# Patient Record
Sex: FEMALE | Race: WHITE | Employment: FULL TIME | URBAN - METROPOLITAN AREA
[De-identification: names, ages, dates, MRNs, and addresses within clinical notes are randomized per-mention and may not be internally consistent; named-entity substitution may affect disease eponyms.]

---

## 2018-02-21 PROBLEM — J45.909 UNCOMPLICATED ASTHMA: Status: ACTIVE | Noted: 2018-02-21

## 2018-02-21 PROBLEM — R23.2 HOT FLASHES: Status: ACTIVE | Noted: 2018-02-21

## 2018-02-21 PROBLEM — J30.9 ALLERGIC RHINITIS: Status: ACTIVE | Noted: 2018-02-21

## 2018-02-21 PROBLEM — K21.9 GASTROESOPHAGEAL REFLUX DISEASE: Status: ACTIVE | Noted: 2018-02-21

## 2018-02-21 PROBLEM — Z86.010 HISTORY OF COLON POLYPS: Status: ACTIVE | Noted: 2018-02-21

## 2018-02-21 PROBLEM — K59.00 CONSTIPATION: Status: ACTIVE | Noted: 2018-02-21

## 2018-02-21 PROBLEM — Z85.3 HISTORY OF BREAST CANCER IN FEMALE: Status: ACTIVE | Noted: 2018-02-21

## 2018-03-21 ENCOUNTER — HOSPITAL ENCOUNTER (OUTPATIENT)
Dept: MAMMOGRAPHY | Age: 58
Discharge: HOME | End: 2018-03-21
Attending: INTERNAL MEDICINE
Payer: COMMERCIAL

## 2018-03-21 DIAGNOSIS — Z85.3 HISTORY OF BREAST CANCER IN FEMALE: ICD-10-CM

## 2018-03-21 DIAGNOSIS — Z12.31 ENCOUNTER FOR SCREENING MAMMOGRAM FOR MALIGNANT NEOPLASM OF BREAST: ICD-10-CM

## 2018-03-21 PROCEDURE — 77063 BREAST TOMOSYNTHESIS BI: CPT

## 2018-05-24 ENCOUNTER — HOSPITAL ENCOUNTER (OUTPATIENT)
Dept: LAB | Age: 58
Discharge: HOME | End: 2018-05-24

## 2018-05-24 PROCEDURE — 88305 TISSUE EXAM BY PATHOLOGIST: CPT | Performed by: INTERNAL MEDICINE

## 2018-10-18 PROBLEM — B00.9 HSV-1 (HERPES SIMPLEX VIRUS 1) INFECTION: Status: ACTIVE | Noted: 2018-10-18

## 2019-04-18 ENCOUNTER — HOSPITAL ENCOUNTER (OUTPATIENT)
Dept: MAMMOGRAPHY | Age: 59
Discharge: HOME | End: 2019-04-18
Attending: INTERNAL MEDICINE
Payer: COMMERCIAL

## 2019-04-18 DIAGNOSIS — Z12.31 VISIT FOR SCREENING MAMMOGRAM: ICD-10-CM

## 2019-04-18 PROCEDURE — 77063 BREAST TOMOSYNTHESIS BI: CPT

## 2019-07-18 PROBLEM — M19.042 OSTEOARTHRITIS OF BOTH HANDS: Status: ACTIVE | Noted: 2019-07-18

## 2019-07-18 PROBLEM — M77.32 HEEL SPUR, LEFT: Status: ACTIVE | Noted: 2019-07-18

## 2019-07-18 PROBLEM — M19.041 OSTEOARTHRITIS OF BOTH HANDS: Status: ACTIVE | Noted: 2019-07-18

## 2019-07-18 PROBLEM — L65.9 ALOPECIA: Status: ACTIVE | Noted: 2019-07-18

## 2021-03-30 ENCOUNTER — TRANSCRIBE ORDER (OUTPATIENT)
Dept: SCHEDULING | Age: 61
End: 2021-03-30

## 2021-03-30 DIAGNOSIS — Z12.31 VISIT FOR SCREENING MAMMOGRAM: Primary | ICD-10-CM

## 2021-05-20 ENCOUNTER — HOSPITAL ENCOUNTER (OUTPATIENT)
Dept: MAMMOGRAPHY | Age: 61
Discharge: HOME | End: 2021-05-20
Attending: INTERNAL MEDICINE
Payer: COMMERCIAL

## 2021-05-20 DIAGNOSIS — Z12.31 VISIT FOR SCREENING MAMMOGRAM: ICD-10-CM

## 2021-05-20 PROCEDURE — 77067 SCR MAMMO BI INCL CAD: CPT

## 2021-05-25 ENCOUNTER — HOSPITAL ENCOUNTER (OUTPATIENT)
Dept: MAMMOGRAPHY | Age: 61
Discharge: HOME | End: 2021-05-25
Attending: OBSTETRICS & GYNECOLOGY
Payer: COMMERCIAL

## 2021-05-25 DIAGNOSIS — R92.8 ABNORMAL SCREENING MAMMOGRAM: ICD-10-CM

## 2021-05-25 PROCEDURE — 77061 BREAST TOMOSYNTHESIS UNI: CPT

## 2021-05-25 PROCEDURE — 76642 ULTRASOUND BREAST LIMITED: CPT

## 2022-03-18 PROBLEM — J30.9 ALLERGIC RHINITIS: Status: ACTIVE | Noted: 2018-02-21

## 2022-03-19 PROBLEM — M19.041 OSTEOARTHRITIS OF BOTH HANDS: Status: ACTIVE | Noted: 2019-07-18

## 2022-03-19 PROBLEM — L65.9 ALOPECIA: Status: ACTIVE | Noted: 2019-07-18

## 2022-03-19 PROBLEM — J45.909 UNCOMPLICATED ASTHMA: Status: ACTIVE | Noted: 2018-02-21

## 2022-03-19 PROBLEM — Z85.3 HISTORY OF BREAST CANCER IN FEMALE: Status: ACTIVE | Noted: 2018-02-21

## 2022-03-19 PROBLEM — K21.9 GASTROESOPHAGEAL REFLUX DISEASE: Status: ACTIVE | Noted: 2018-02-21

## 2022-03-19 PROBLEM — Z86.010 HISTORY OF COLON POLYPS: Status: ACTIVE | Noted: 2018-02-21

## 2022-03-19 PROBLEM — B00.9 HSV-1 (HERPES SIMPLEX VIRUS 1) INFECTION: Status: ACTIVE | Noted: 2018-10-18

## 2022-03-19 PROBLEM — M19.042 OSTEOARTHRITIS OF BOTH HANDS: Status: ACTIVE | Noted: 2019-07-18

## 2022-03-19 PROBLEM — Z86.0100 HISTORY OF COLON POLYPS: Status: ACTIVE | Noted: 2018-02-21

## 2022-03-20 PROBLEM — K59.00 CONSTIPATION: Status: ACTIVE | Noted: 2018-02-21

## 2022-03-20 PROBLEM — R23.2 HOT FLASHES: Status: ACTIVE | Noted: 2018-02-21

## 2022-03-20 PROBLEM — M77.32 HEEL SPUR, LEFT: Status: ACTIVE | Noted: 2019-07-18

## 2022-05-25 RX ORDER — ESTRADIOL 10 UG/1
INSERT VAGINAL
Qty: 8 TABLET | Refills: 12 | OUTPATIENT
Start: 2022-05-25

## 2022-06-08 ENCOUNTER — HOSPITAL ENCOUNTER (OUTPATIENT)
Dept: MAMMOGRAPHY | Age: 62
Discharge: HOME | End: 2022-06-11
Payer: COMMERCIAL

## 2022-06-08 DIAGNOSIS — Z12.31 VISIT FOR SCREENING MAMMOGRAM: ICD-10-CM

## 2022-06-08 PROCEDURE — 77063 BREAST TOMOSYNTHESIS BI: CPT

## 2022-06-13 DIAGNOSIS — Z85.3 PERSONAL HISTORY OF BREAST CANCER: Primary | ICD-10-CM

## 2022-06-13 DIAGNOSIS — R92.2 DENSE BREAST TISSUE: ICD-10-CM

## 2022-06-16 ENCOUNTER — OFFICE VISIT (OUTPATIENT)
Dept: OBGYN CLINIC | Age: 62
End: 2022-06-16
Payer: COMMERCIAL

## 2022-06-16 VITALS
DIASTOLIC BLOOD PRESSURE: 72 MMHG | SYSTOLIC BLOOD PRESSURE: 138 MMHG | WEIGHT: 215.6 LBS | BODY MASS INDEX: 34.65 KG/M2 | HEIGHT: 66 IN

## 2022-06-16 DIAGNOSIS — Z01.419 WELL WOMAN EXAM: Primary | ICD-10-CM

## 2022-06-16 DIAGNOSIS — Z13.89 SCREENING FOR GENITOURINARY CONDITION: ICD-10-CM

## 2022-06-16 PROCEDURE — 99396 PREV VISIT EST AGE 40-64: CPT | Performed by: OBSTETRICS & GYNECOLOGY

## 2022-06-16 RX ORDER — PHENTERMINE HYDROCHLORIDE 37.5 MG/1
37.5 CAPSULE ORAL EVERY MORNING
COMMUNITY

## 2022-06-16 RX ORDER — ESCITALOPRAM OXALATE 20 MG/1
20 TABLET ORAL DAILY
Qty: 30 TABLET | Refills: 11 | Status: SHIPPED | OUTPATIENT
Start: 2022-06-16

## 2022-06-16 RX ORDER — ESTRADIOL 10 UG/1
10 INSERT VAGINAL
Qty: 24 TABLET | Refills: 3 | Status: SHIPPED | OUTPATIENT
Start: 2022-06-16

## 2022-06-16 RX ORDER — OMEPRAZOLE 20 MG/1
20 CAPSULE, DELAYED RELEASE ORAL DAILY
COMMUNITY

## 2022-06-16 NOTE — PROGRESS NOTES
HPI: Ms. Riki Farrar is a 57 y.o.   OB History        0    Para   0    Term   0       0    AB   0    Living   0       SAB   0    IAB   0    Ectopic   0    Molar   0    Multiple   0    Live Births   0             who is here today for a well woman exam. She complains of hot flashes. She also complains of low libido and weight gain. Currently using vagifem and feels like her symptoms are become worse. Seeing David Santos at [facility]- had hormones checked of course menopausal levels  E2  Undetectable   Retired  Recommended mri   Date Performed Result   PAP 3/29/2007 Hysterectomy   2007   Mammogram 2022 STABLE POST-TREATMENT APPEARANCE OF THE LEFT BREAST WITH NO SPECIFIC   MAMMOGRAPHIC EVIDENCE FOR MALIGNANCY ON EITHER SIDE.  FOLLOW-UP BILATERAL   SCREENING MAMMOGRAPHY IS RECOMMENDED IN ONE YEAR.       2.  HIGH -RISK SCREENING BREAST MRI SHOULD BE CONSIDERED AT THIS TIME FOR THIS   57YEAR-OLD WITH HETEROGENEOUSLY DENSE BREAST PARENCHYMA AND A PERSONAL HISTORY   OF BREAST CANCER. MRI order placed    Colonoscopy 2018  Repeat in  \"CECAL POLYP\":          TUBULAR ADENOMA.          B:  \"TRANSVERSE COLON POLYP\":          TUBULAR ADENOMA. Dexa 2011 WNL         No obstetric history on file. GYN History           No LMP recorded. Patient has had a hysterectomy.      Past Medical History:  Past Medical History:   Diagnosis Date    Allergic rhinitis 2018    Alopecia 2019    Small spot - following with dermatology    Anxiety     Breast cancer ([facility] [address]     Broken ankle     Constipation 2018    Depression     Gastroesophageal reflux disease 2018    H/O back injury 6118,2199 & 2002    bulging dics    Heel spur, left 2019    History of breast cancer in female 2018 left breast lumpectomy s/p 3 years of Tamoxifen stopped due to intolerance    History of colon polyps 2018    Hot flashes 2018    HSV-1 (herpes simplex virus 1) infection 10/18/2018    Menopause     Osteoarthritis of both hands 7/18/2019    Plantar fasciitis of left foot     Postmenopausal     Sinus infection     Uncomplicated asthma 8/71/1626    Vaginal atrophy        Past Surgical History:  Past Surgical History:   Procedure Laterality Date    BREAST BIOPSY Right     BREAST LUMPECTOMY Left     Core Bx    CATARACT REMOVAL  2017    COLONOSCOPY  2008    see scanned documents - polyp noted    ENDOMETRIAL CRYOABLATION      LUMBAR LAMINECTOMY  2007    ORTHOPEDIC SURGERY Left 2004    Fx ankle    OTHER SURGICAL HISTORY      Yag procedure on eyes    ARCADIO AND BSO (CERVIX REMOVED)  2007       Allergies:   No Known Allergies    Medication History:  Current Outpatient Medications   Medication Sig Dispense Refill    omeprazole (PRILOSEC) 20 MG delayed release capsule Take 20 mg by mouth daily      phentermine (ADIPEX-P) 37.5 MG capsule Take 37.5 mg by mouth every morning.       Lactobacillus (CULTURELLE DIGESTIVE WOMENS PO) Take by mouth      COLLAGEN PO Take by mouth      albuterol sulfate  (90 Base) MCG/ACT inhaler Inhale into the lungs      Azelastine-Fluticasone 137-50 MCG/ACT SUSP by Nasal route      clobetasol (TEMOVATE) 0.05 % cream Apply pea sized about to affected area 4x daily x 7 days, then 3x daily x 7 days, then 2x daily x 7 days, and then once weekly x 7 days      diclofenac sodium (VOLTAREN) 1 % GEL Use small amount to hands twice daily as needed      escitalopram (LEXAPRO) 10 MG tablet TAKE ONE TABLET BY MOUTH EVERY DAY      Estradiol (VAGIFEM) 10 MCG TABS vaginal tablet Place 10 mcg vaginally      omeprazole (PRILOSEC OTC) 20 MG tablet Take 20 mg by mouth daily      valACYclovir (VALTREX) 1 g tablet Take 2 tabs twice daily for 24 hours with onset of sympotms      docusate (COLACE, DULCOLAX) 100 MG CAPS Take 100 mg by mouth 2 times daily      famotidine (PEPCID) 20 MG tablet Take 20 mg by mouth 2 times daily       No current facility-administered medications for this visit. Social History:  Social History     Socioeconomic History    Marital status:      Spouse name: Not on file    Number of children: Not on file    Years of education: Not on file    Highest education level: Not on file   Occupational History    Not on file   Tobacco Use    Smoking status: Never Smoker    Smokeless tobacco: Never Used   Substance and Sexual Activity    Alcohol use: Yes    Drug use: No    Sexual activity: Not on file   Other Topics Concern    Not on file   Social History Narrative    Lives with    No children     Social Determinants of Health     Financial Resource Strain:     Difficulty of Paying Living Expenses: Not on file   Food Insecurity:     Worried About Running Out of Food in the Last Year: Not on file    Jenny of Food in the Last Year: Not on file   Transportation Needs:     Lack of Transportation (Medical): Not on file    Lack of Transportation (Non-Medical):  Not on file   Physical Activity:     Days of Exercise per Week: Not on file    Minutes of Exercise per Session: Not on file   Stress:     Feeling of Stress : Not on file   Social Connections:     Frequency of Communication with Friends and Family: Not on file    Frequency of Social Gatherings with Friends and Family: Not on file    Attends Shinto Services: Not on file    Active Member of [address] [place] [address] [employer] or Organizations: Not on file    Attends Club or Organization Meetings: Not on file    Marital Status: Not on file   Intimate Partner Violence:     Fear of Current or Ex-Partner: Not on file    Emotionally Abused: Not on file    Physically Abused: Not on file    Sexually Abused: Not on file   Housing Stability:     Unable to Pay for Housing in the Last Year: Not on file    Number of [place] in the Last Year: Not on file    Unstable Housing in the Last Year: Not on file       Family History:  Family History   Problem Relation Age of Onset  Stroke Mother     Diabetes Mother     Heart Disease Mother     Alzheimer's Disease Mother     Breast Cancer Neg Hx     Cancer Mother         lung    Diabetes Father     Hypertension Father     Alzheimer's Disease Father     Dementia Mother        Review of Systems - General ROS: negative except for that discussed in HPI      ROS:  Feeling well. No dyspnea or chest pain on exertion. No abdominal pain, change in bowel habits, black or bloody stools. No urinary tract symptoms. No neurological complaints. Objective:   /72   Ht 5' 6\" (1.676 m)   Wt 215 lb 9.6 oz (97.8 kg)   BMI 34.80 kg/m²     No results found for any visits on 06/16/22. The patient appears well, alert, oriented x 3, in no distress. ENT normal.  Neck supple. No adenopathy or thyromegaly. Lungs:  clear, good air entry, no wheezes, rhonchi or rales. Heart:  S1 and S2 normal, no murmurs, regular rate and rhythm. Abdomen:  soft without tenderness, guarding, mass or organomegaly. Extremities show no edema, normal peripheral pulses. Neurological is normal, no focal findings. BREAST EXAM: breasts appear normal, no suspicious masses, no skin or nipple changes or axillary nodes    PELVIC EXAM: External genitalia is within normal limits, urethra, urethra meatus and bladder are midline well supported. Vagina is well rugated, Cervix absent no ovarian masses palpated    Assessment/Plan:      Diagnosis Orders   1. Well woman exam  AMB POC URINALYSIS DIP STICK AUTO W/O MICRO   2. Screening for genitourinary condition  AMB POC URINALYSIS DIP STICK AUTO W/O MICRO     Encounter Diagnoses   Name Primary?     Well woman exam Yes    Screening for genitourinary condition      Orders Placed This Encounter   Procedures    AMB POC URINALYSIS DIP STICK AUTO W/O MICRO       mammogram  return annually or prn  Increase lexapro for severe pms  MRI  Refill valtrex and  vagifem    Dada Blackmon RN

## 2022-06-28 ENCOUNTER — HOSPITAL ENCOUNTER (OUTPATIENT)
Dept: MRI IMAGING | Age: 62
Discharge: HOME | End: 2022-07-01
Payer: COMMERCIAL

## 2022-06-28 DIAGNOSIS — Z85.3 PERSONAL HISTORY OF BREAST CANCER: ICD-10-CM

## 2022-06-28 DIAGNOSIS — R92.2 DENSE BREAST TISSUE: ICD-10-CM

## 2022-06-28 PROCEDURE — C8908 MRI W/O FOL W/CONT, BREAST,: HCPCS

## 2022-06-28 PROCEDURE — 2580000003 HC RX 258: Performed by: OBSTETRICS & GYNECOLOGY

## 2022-06-28 PROCEDURE — A9579 GAD-BASE MR CONTRAST NOS,1ML: HCPCS | Performed by: OBSTETRICS & GYNECOLOGY

## 2022-06-28 PROCEDURE — 6360000004 HC RX CONTRAST MEDICATION: Performed by: OBSTETRICS & GYNECOLOGY

## 2022-06-28 RX ORDER — SODIUM CHLORIDE 0.9 % (FLUSH) 0.9 %
30 SYRINGE (ML) INJECTION AS NEEDED
Status: DISCONTINUED | OUTPATIENT
Start: 2022-06-28 | End: 2022-07-02 | Stop reason: HOSPADM

## 2022-06-28 RX ADMIN — GADOTERIDOL 20 ML: 279.3 INJECTION, SOLUTION INTRAVENOUS at 17:28

## 2022-06-28 RX ADMIN — SODIUM CHLORIDE, PRESERVATIVE FREE 30 ML: 5 INJECTION INTRAVENOUS at 17:27

## 2022-07-05 ENCOUNTER — HOSPITAL ENCOUNTER (OUTPATIENT)
Dept: MAMMOGRAPHY | Age: 62
Discharge: HOME | End: 2022-07-08
Payer: COMMERCIAL

## 2022-07-05 ENCOUNTER — APPOINTMENT (OUTPATIENT)
Dept: MAMMOGRAPHY | Age: 62
End: 2022-07-05
Payer: COMMERCIAL

## 2022-07-05 DIAGNOSIS — Z85.3 HX OF BREAST CANCER: ICD-10-CM

## 2022-07-05 DIAGNOSIS — R92.8 ABNORMAL MRI, BREAST: ICD-10-CM

## 2022-07-05 PROCEDURE — 76642 ULTRASOUND BREAST LIMITED: CPT

## 2022-07-05 PROCEDURE — 77065 DX MAMMO INCL CAD UNI: CPT

## 2022-07-26 ENCOUNTER — TRANSCRIBE ORDER (OUTPATIENT)
Dept: SCHEDULING | Age: 62
End: 2022-07-26

## 2022-07-26 DIAGNOSIS — M47.26 OSTEOARTHRITIS OF SPINE WITH RADICULOPATHY, LUMBAR REGION: Primary | ICD-10-CM

## 2023-07-17 ENCOUNTER — TRANSCRIBE ORDERS (OUTPATIENT)
Dept: SCHEDULING | Age: 63
End: 2023-07-17

## 2023-07-17 ENCOUNTER — TELEPHONE (OUTPATIENT)
Dept: OBGYN CLINIC | Age: 63
End: 2023-07-17

## 2023-07-17 DIAGNOSIS — Z12.31 BREAST CANCER SCREENING BY MAMMOGRAM: Primary | ICD-10-CM

## 2023-07-17 RX ORDER — ESTRADIOL 10 UG/1
10 INSERT VAGINAL
Qty: 16 TABLET | Refills: 0 | Status: SHIPPED | OUTPATIENT
Start: 2023-07-17

## 2023-07-17 NOTE — TELEPHONE ENCOUNTER
Pt LVM requesting refills on estradiol. Pt last seen 6/16/2022 for [employer] and has next [address] scheduled for 8/21/23. Meds filled to next appointment per standing orders.

## 2023-07-19 ENCOUNTER — HOSPITAL ENCOUNTER (OUTPATIENT)
Dept: MAMMOGRAPHY | Age: 63
Discharge: HOME | End: 2023-07-22
Attending: OBSTETRICS & GYNECOLOGY
Payer: MEDICARE

## 2023-07-19 DIAGNOSIS — Z12.31 BREAST CANCER SCREENING BY MAMMOGRAM: ICD-10-CM

## 2023-07-19 PROCEDURE — 77063 BREAST TOMOSYNTHESIS BI: CPT

## 2023-07-20 ENCOUNTER — TELEPHONE (OUTPATIENT)
Dept: OBGYN CLINIC | Age: 63
End: 2023-07-20

## 2023-07-20 NOTE — TELEPHONE ENCOUNTER
Spoke with patient regarding breast MRI recommended by radiology. Pt stated she had an MRI done last year and her out of pocket cost was very high. Pt has a personal history of breast cancer. Pt reports she has now switched to Medicare. Pt instructed to contact medicare and discuss her medical history and recommendation by radiologist for breast MRI to determine coverage. Pt has WWE 8/21/23 with Dr Noe De Leon and stated she will discuss the MRI with her at that time as well. No further questions. Pt to call if she would like MRI order placed prior to her WWE.

## 2023-07-31 ENCOUNTER — TELEPHONE (OUTPATIENT)
Dept: OBGYN CLINIC | Age: 63
End: 2023-07-31

## 2023-07-31 RX ORDER — VALACYCLOVIR HYDROCHLORIDE 1 G/1
TABLET, FILM COATED ORAL
Qty: 10 TABLET | Refills: 0 | Status: SHIPPED | OUTPATIENT
Start: 2023-07-31

## 2023-07-31 NOTE — TELEPHONE ENCOUNTER
Pt LVM requesting refills on Valtrex. Pt has [employer] scheduled for 8/21/23. Pt last seen 6/16/22.  Medication refilled per standing orders to next appointment

## 2023-08-14 ENCOUNTER — TELEPHONE (OUTPATIENT)
Dept: OBGYN CLINIC | Age: 63
End: 2023-08-14

## 2023-08-14 RX ORDER — ESCITALOPRAM OXALATE 20 MG/1
20 TABLET ORAL DAILY
Qty: 30 TABLET | Refills: 0 | Status: SHIPPED | OUTPATIENT
Start: 2023-08-14

## 2023-08-14 NOTE — TELEPHONE ENCOUNTER
Pt LVM requesting refill on Lexapro. Pt scheduled for [facility] 8/21/23. Meds refilled per standing orders to next appointment.

## 2023-08-17 NOTE — PROGRESS NOTES
HPI: Ms. Andres Escobedo is a 70 y.o.   OB History          0    Para   0    Term   0       0    AB   0    Living   0         SAB   0    IAB   0    Ectopic   0    Molar   0    Multiple   0    Live Births   0             who is here today for a well woman exam. She complains of nothing  Needs refills on Lexapro and Vafigem 90 day supply. Retired volunteering  Feels like she can go back down to 10 mg Lexapro         Date Performed Result   PAP 3/9/07   Hysterectomy    Mammogram 23 BD3, Neg  2. Patient is due for follow-up MRI (as reported on 2022 and 2022  imaging) to ensure stability of right enhancement. Colonoscopy 18  Needs to repeat in 5 years due now   A:  \"CECAL POLYP\":          TUBULAR ADENOMA. B:  \"TRANSVERSE COLON POLYP\":          TUBULAR ADENOMA   Dexa 2011 wnl                 GYN History           No LMP recorded. Patient has had a hysterectomy.        Past Medical History:  Past Medical History:   Diagnosis Date    Allergic rhinitis 2018    Allergic rhinitis 2018    Alopecia 2019    Small spot - following with dermatology    Alopecia 2019    Small spot - following with dermatology    Anxiety     Anxiety     Breast cancer ([address])     Breast cancer ([address])     Broken ankle 2005    Broken ankle     Constipation 2018    Constipation 2018    Depression     Depression     Gastroesophageal reflux disease 2018    Gastroesophageal reflux disease 2018    H/O back injury 3744,1902 &     bulging dics    H/O back injury 7312,1432 &     bulging dics    Heel spur, left 2019    Heel spur, left 2019    History of breast cancer in female 2018 left breast lumpectomy s/p 3 years of Tamoxifen stopped due to intolerance    History of breast cancer in female 2018 left breast lumpectomy s/p 3 years of Tamoxifen stopped due to intolerance    History of colon polyps 2018    History of colon polyps

## 2023-08-21 ENCOUNTER — OFFICE VISIT (OUTPATIENT)
Dept: OBGYN CLINIC | Age: 63
End: 2023-08-21
Payer: MEDICARE

## 2023-08-21 VITALS
WEIGHT: 222.6 LBS | BODY MASS INDEX: 35.77 KG/M2 | DIASTOLIC BLOOD PRESSURE: 84 MMHG | HEIGHT: 66 IN | SYSTOLIC BLOOD PRESSURE: 134 MMHG

## 2023-08-21 DIAGNOSIS — Z98.890 HISTORY OF LUMPECTOMY OF LEFT BREAST: ICD-10-CM

## 2023-08-21 DIAGNOSIS — Z85.3 HISTORY OF BREAST CANCER IN FEMALE: ICD-10-CM

## 2023-08-21 DIAGNOSIS — Z92.89 HISTORY OF MRI: ICD-10-CM

## 2023-08-21 DIAGNOSIS — Z01.419 WELL WOMAN EXAM: Primary | ICD-10-CM

## 2023-08-21 DIAGNOSIS — Z12.31 SCREENING MAMMOGRAM FOR BREAST CANCER: ICD-10-CM

## 2023-08-21 DIAGNOSIS — R92.2 DENSE BREAST TISSUE ON MAMMOGRAM: ICD-10-CM

## 2023-08-21 PROCEDURE — G0101 CA SCREEN;PELVIC/BREAST EXAM: HCPCS | Performed by: OBSTETRICS & GYNECOLOGY

## 2023-08-21 RX ORDER — LACTOBACILLUS RHAMNOSUS GG 10B CELL
1 CAPSULE ORAL DAILY
COMMUNITY

## 2023-08-21 RX ORDER — ESTRADIOL 10 UG/1
10 INSERT VAGINAL
Qty: 24 TABLET | Refills: 3 | Status: SHIPPED | OUTPATIENT
Start: 2023-08-21

## 2023-08-21 RX ORDER — ESCITALOPRAM OXALATE 10 MG/1
10 TABLET ORAL DAILY
Qty: 90 TABLET | Refills: 3 | Status: SHIPPED | OUTPATIENT
Start: 2023-08-21

## 2023-09-11 RX ORDER — ESCITALOPRAM OXALATE 20 MG/1
20 TABLET ORAL DAILY
Qty: 30 TABLET | Refills: 0 | OUTPATIENT
Start: 2023-09-11

## 2023-09-12 RX ORDER — ESCITALOPRAM OXALATE 20 MG/1
20 TABLET ORAL DAILY
Qty: 30 TABLET | Refills: 0 | OUTPATIENT
Start: 2023-09-12

## 2023-09-13 RX ORDER — ESCITALOPRAM OXALATE 20 MG/1
20 TABLET ORAL DAILY
Qty: 30 TABLET | Refills: 0 | OUTPATIENT
Start: 2023-09-13

## 2023-09-26 ENCOUNTER — HOSPITAL ENCOUNTER (OUTPATIENT)
Dept: MAMMOGRAPHY | Age: 63
Discharge: HOME | End: 2023-09-29
Payer: MEDICARE

## 2023-09-26 DIAGNOSIS — Z78.0 ASYMPTOMATIC MENOPAUSE: ICD-10-CM

## 2023-09-26 PROCEDURE — 77080 DXA BONE DENSITY AXIAL: CPT

## 2023-09-28 ENCOUNTER — TELEPHONE (OUTPATIENT)
Dept: OBGYN CLINIC | Age: 63
End: 2023-09-28

## 2023-09-28 NOTE — TELEPHONE ENCOUNTER
Denial forms received from medicare regarding pt's breast MRI. Attempted to submit appeal online and via phone but unable to complete submission and file. Medicare representative stated they would need to speak with the pt. Pt notified and will contact medicare and follow up with RN.

## 2023-10-03 NOTE — TELEPHONE ENCOUNTER
Able to speak with [employer] representative and forms obtained for Br MRI appeal. Forms completed and faxed. Pt notified.

## 2023-10-30 ENCOUNTER — HOSPITAL ENCOUNTER (OUTPATIENT)
Dept: ULTRASOUND IMAGING | Age: 63
Discharge: HOME | End: 2023-11-02
Payer: MEDICARE

## 2023-10-30 DIAGNOSIS — I73.9 PAD (PERIPHERAL ARTERY DISEASE) (HCC): ICD-10-CM

## 2023-10-30 PROCEDURE — 93922 UPR/L XTREMITY ART 2 LEVELS: CPT

## 2023-11-29 ENCOUNTER — HOSPITAL ENCOUNTER (OUTPATIENT)
Dept: MRI IMAGING | Age: 63
Discharge: HOME | End: 2023-12-02
Attending: OBSTETRICS & GYNECOLOGY
Payer: MEDICARE

## 2023-11-29 DIAGNOSIS — R92.2 DENSE BREAST TISSUE ON MAMMOGRAM: ICD-10-CM

## 2023-11-29 DIAGNOSIS — Z92.89 HISTORY OF MRI: ICD-10-CM

## 2023-11-29 DIAGNOSIS — Z98.890 HISTORY OF LUMPECTOMY OF LEFT BREAST: ICD-10-CM

## 2023-11-29 DIAGNOSIS — Z85.3 HISTORY OF BREAST CANCER IN FEMALE: ICD-10-CM

## 2023-11-29 PROCEDURE — 6360000004 HC RX CONTRAST MEDICATION: Performed by: OBSTETRICS & GYNECOLOGY

## 2023-11-29 PROCEDURE — C8908 MRI W/O FOL W/CONT, BREAST,: HCPCS

## 2023-11-29 PROCEDURE — A9579 GAD-BASE MR CONTRAST NOS,1ML: HCPCS | Performed by: OBSTETRICS & GYNECOLOGY

## 2023-11-29 RX ADMIN — GADOTERIDOL 20 ML: 279.3 INJECTION, SOLUTION INTRAVENOUS at 12:41

## 2024-08-15 RX ORDER — ESCITALOPRAM OXALATE 10 MG/1
10 TABLET ORAL DAILY
Qty: 90 TABLET | Refills: 3 | OUTPATIENT
Start: 2024-08-15

## 2024-08-27 ENCOUNTER — HOSPITAL ENCOUNTER (OUTPATIENT)
Dept: MAMMOGRAPHY | Age: 64
Discharge: HOME | End: 2024-08-30
Attending: OBSTETRICS & GYNECOLOGY
Payer: MEDICARE

## 2024-08-27 VITALS — HEIGHT: 65 IN | BODY MASS INDEX: 36.65 KG/M2 | WEIGHT: 220 LBS

## 2024-08-27 DIAGNOSIS — Z12.31 SCREENING MAMMOGRAM FOR BREAST CANCER: ICD-10-CM

## 2024-08-27 PROCEDURE — 77063 BREAST TOMOSYNTHESIS BI: CPT

## 2024-09-24 ENCOUNTER — OFFICE VISIT (OUTPATIENT)
Dept: ORTHOPEDIC SURGERY | Age: 64
End: 2024-09-24
Payer: MEDICARE

## 2024-09-24 DIAGNOSIS — S96.911A STRAIN OF TENDON OF RIGHT FOOT AND ANKLE, INITIAL ENCOUNTER: Primary | ICD-10-CM

## 2024-09-24 PROCEDURE — 99213 OFFICE O/P EST LOW 20 MIN: CPT | Performed by: ORTHOPAEDIC SURGERY

## 2024-09-24 PROCEDURE — 1123F ACP DISCUSS/DSCN MKR DOCD: CPT | Performed by: ORTHOPAEDIC SURGERY

## 2024-09-24 RX ORDER — FEXOFENADINE HCL AND PSEUDOEPHEDRINE HCL 180; 240 MG/1; MG/1
1 TABLET, EXTENDED RELEASE ORAL
COMMUNITY

## 2024-09-24 RX ORDER — OLOPATADINE HYDROCHLORIDE AND MOMETASONE FUROATE 25; 665 UG/1; UG/1
SPRAY, METERED NASAL
COMMUNITY
Start: 2022-11-21

## 2024-09-24 RX ORDER — GUAIFENESIN AND PSEUDOEPHEDRINE HCL 1200; 120 MG/1; MG/1
TABLET, EXTENDED RELEASE ORAL
COMMUNITY

## 2024-09-24 RX ORDER — LOSARTAN POTASSIUM 25 MG/1
25 TABLET ORAL
COMMUNITY

## 2024-09-24 RX ORDER — ASPIRIN 81 MG/1
81 TABLET ORAL DAILY
COMMUNITY

## 2024-09-24 RX ORDER — DEXAMETHASONE 2 MG/1
TABLET ORAL
COMMUNITY
Start: 2023-10-05

## 2024-09-24 RX ORDER — FLUTICASONE FUROATE, UMECLIDINIUM BROMIDE AND VILANTEROL TRIFENATATE 100; 62.5; 25 UG/1; UG/1; UG/1
1 POWDER RESPIRATORY (INHALATION)
COMMUNITY
Start: 2022-11-21

## 2024-09-24 RX ORDER — CICLESONIDE 80 UG/1
1 AEROSOL, METERED RESPIRATORY (INHALATION)
COMMUNITY
Start: 2022-05-16

## 2024-12-17 ENCOUNTER — OFFICE VISIT (OUTPATIENT)
Dept: ORTHOPEDIC SURGERY | Age: 64
End: 2024-12-17
Payer: MEDICARE

## 2024-12-17 DIAGNOSIS — M20.41 HAMMERTOE OF SECOND TOE OF RIGHT FOOT: Primary | ICD-10-CM

## 2024-12-17 DIAGNOSIS — T84.84XA PAINFUL ORTHOPAEDIC HARDWARE (HCC): ICD-10-CM

## 2024-12-17 PROCEDURE — 1123F ACP DISCUSS/DSCN MKR DOCD: CPT | Performed by: PHYSICIAN ASSISTANT

## 2024-12-17 PROCEDURE — 99214 OFFICE O/P EST MOD 30 MIN: CPT | Performed by: PHYSICIAN ASSISTANT

## 2024-12-17 NOTE — PROGRESS NOTES
er  
previously,   X-Ray RIGHT Ankle 3 vw (AP/Lateral/Oblique) for ankle pain   Findings: No signs of displacement of the mortise.  No signs of acute injury or fracture to the talus, mortise, fibula, or distal tibia.  Prior orthopedic hardware in place on the distal fibula without signs of hardware lucency, failure or migration.  No signs of chronic damage, neoplastic process or infection   Impression: Prior orthopedic hardware otherwise stable ankle      , and   X-Ray RIGHT Foot 2 vw (AP/Oblique) for foot pain   Findings: No signs of fracture or dislocations.  Hammertoe deformity of the second toe.  The TMT joints are aligned, there are no signs of neoplastic disease, or chronic disease.  The midtarsal and subtalar joints appear normal.   Impression: Second toe hammertoe            Differential Diagnosis:     Painful orthopedic hardware  Second hammertoe     Treatment Plan:     4 This is a chronic illness/condition with exacerbation and progression  Treatment at this time: Elective major surgery with procedural risk factors    Weight-bearing status: WBAT          I had a thoroughly informed the patient of the plan for treatment.    I discussed non operative treatment initially.  We discussed Toes: Silicone spacers, toe sleeves, wider shoes, activity modifications, topical NSAIDS, toe splints and even specific therapy for the toes.  At this point Non surgical treatment has not relieved the patient symptoms    We discussed multiple surgical options.  We discussed surgery to be right second hammertoe correction with K wire and right ankle hardware removal.   I discussed with this patient  the risk associated with the outlined surgery.  These risk include infection, delayed wound healing, hardware failure, painful hardware, recurring wounds, recurring pain, damage to surrounding structures such as nerves, vessels, tendons, ligaments, and joints.  I discussed how no surgery is perfect and this surgery may not be successful.

## 2024-12-19 ENCOUNTER — TELEPHONE (OUTPATIENT)
Dept: ORTHOPEDIC SURGERY | Age: 64
End: 2024-12-19

## 2024-12-19 NOTE — TELEPHONE ENCOUNTER
Patient call to cx her surgery, patient would like to hold off at this time. She would also like for you to give her a call.

## 2024-12-20 ENCOUNTER — TELEPHONE (OUTPATIENT)
Dept: ORTHOPEDIC SURGERY | Age: 64
End: 2024-12-20